# Patient Record
Sex: FEMALE | Race: ASIAN | NOT HISPANIC OR LATINO | URBAN - METROPOLITAN AREA
[De-identification: names, ages, dates, MRNs, and addresses within clinical notes are randomized per-mention and may not be internally consistent; named-entity substitution may affect disease eponyms.]

---

## 2023-08-15 ENCOUNTER — EMERGENCY (EMERGENCY)
Facility: HOSPITAL | Age: 58
LOS: 1 days | Discharge: HOME | End: 2023-08-15
Attending: EMERGENCY MEDICINE
Payer: MEDICARE

## 2023-08-15 VITALS
OXYGEN SATURATION: 100 % | RESPIRATION RATE: 20 BRPM | SYSTOLIC BLOOD PRESSURE: 151 MMHG | TEMPERATURE: 98 F | DIASTOLIC BLOOD PRESSURE: 91 MMHG | WEIGHT: 108.03 LBS | HEART RATE: 66 BPM

## 2023-08-15 VITALS
SYSTOLIC BLOOD PRESSURE: 133 MMHG | TEMPERATURE: 98 F | HEART RATE: 60 BPM | DIASTOLIC BLOOD PRESSURE: 84 MMHG | RESPIRATION RATE: 18 BRPM | OXYGEN SATURATION: 98 %

## 2023-08-15 PROCEDURE — 99283 EMERGENCY DEPT VISIT LOW MDM: CPT

## 2023-08-15 PROCEDURE — 99283 EMERGENCY DEPT VISIT LOW MDM: CPT | Mod: FS

## 2023-08-15 RX ORDER — DIAZEPAM 5 MG
1 TABLET ORAL
Qty: 5 | Refills: 0
Start: 2023-08-15 | End: 2023-08-19

## 2023-08-15 RX ORDER — ACETAMINOPHEN 500 MG
975 TABLET ORAL ONCE
Refills: 0 | Status: COMPLETED | OUTPATIENT
Start: 2023-08-15 | End: 2023-08-15

## 2023-08-15 RX ADMIN — Medication 975 MILLIGRAM(S): at 15:59

## 2023-08-15 NOTE — ED PROVIDER NOTE - OBJECTIVE STATEMENT
59yo F with PMH glaucoma, hypothyroidism presenting with L-sided jaw pain s/p jaw dislocation earlier today. Reports history of jaw dislocation > 20 years ago, had followed with a TMJ specialist at [facility], but has had no issues again until recently. Reports more frequently L-sided TMJ dislocations but that she is able to get jaw back in quickly. Today went to eat a mini muffin and L side of jaw dislocated when she opened her mouth, reports spasms of muscles and unable to get jaw back in for 2 hours, finally able to prior to ED. Report she is flying to [place] tomorrow and concerned about reoccurrence. Reports discomfort to L TMJ now, did not take anything for pain. No trouble breathing. 57yo F with PMH glaucoma, hypothyroidism, presenting with L-sided jaw pain s/p jaw dislocation earlier today. Reports history of jaw dislocation > 20 years ago, had followed with a TMJ specialist at [facility], but has had no issues again until recently. Reports more frequently L-sided TMJ dislocations recently but that she is able to get jaw back in quickly. Today went to eat a mini muffin and L side of jaw dislocated when she opened her mouth, reports spasms of cheek muscles and unable to get jaw back in for 2 hours, finally able to prior to coming to ED. Report she is flying to [place] tomorrow and concerned about reoccurrence. Reports discomfort to L TMJ now, did not take anything for pain. No trouble breathing.

## 2023-08-15 NOTE — ED ADULT NURSE NOTE - OBJECTIVE STATEMENT
58Y F AXO3 PMH of glaucoma and no pertinent PSH presented to the ED c/o L sided jaw pain and locking since today. Pt states she has TMJ and endorses that the L side of her jaw "moved out of the socket." Pt reports using a warm compress and massage to "put it back in place." Pt endorses being unable to speak or close mouth for 2 hrs. Upon arrival to the ED, the pt is well appearing, has bilateral even and unlabored chest rise, and ambulatory with steady gait. Upon assessment, pt has even and bilateral peripheral pulses, ROM, even and bilateral strength and sensation of extremities, and ability to close and open mouth. Pt denies fevers , chills, n/v/d, chest pain, SOB, headaches, vision changes, numbness or tingling of extremities. Comfort and safety provided.

## 2023-08-15 NOTE — ED PROVIDER NOTE - CLINICAL SUMMARY MEDICAL DECISION MAKING FREE TEXT BOX
58F hx of TMJ with prior jaw dislocations here with c/o L sided jaw pain after L side jaw dislocation earlier today. Dislocation was non-traumatic, occurred w chewing. Relocation was spontaneous after massaging masseter. Now w masseter pain and spasm on L. No malocclusion. Able to open/close jaw w/o pain or clicking. Will give muscle relaxer Rx and rec NSAIDS, soft diet, FU dentist/TMJ specialist.

## 2023-08-15 NOTE — ED PROVIDER NOTE - NSFOLLOWUPCLINICS_GEN_ALL_ED_FT
[facility] ENT  ENT  [address], [address]  [place] [address]  Phone: [phone]  Fax:   Follow Up Time: 1-3 Days

## 2023-08-15 NOTE — ED PROVIDER NOTE - NSFOLLOWUPINSTRUCTIONS_ED_ALL_ED_FT
Take Ibuprofen (i.e. Motrin, Advil) 600mg every 8 hrs for pain as needed. Take with food.   May alternate with Acetaminophen (Tylenol) 650mg every 6 hours for pain as needed.     Take Valium 5mg once as needed for muscle spasm in setting of jaw dislocation. ***caution drowsiness do not drive or drink alcohol if taking this medication. Do not take more than 1 tab every 8 hours (Max 3 doses per day).    Please follow up with ENT or your TMJ specialist upon discharge.     Return to ER for any new/worsening jaw pain, trouble breathing or swallowing, facial swelling, tongue swelling, or any other concerns. Eat a soft diet. Avoid opening your mouth wide.     Take Ibuprofen (i.e. Motrin, Advil) 600mg every 8 hrs for pain as needed. Take with food.   May alternate with Acetaminophen (Tylenol) 650mg every 6 hours for pain as needed.     Take Valium 5mg once as needed for muscle spasm in setting of jaw dislocation. ***caution drowsiness do not drive or drink alcohol if taking this medication. Do not take more than 1 tab every 8 hours (Max 3 doses per day).    Please follow up with ENT or your TMJ specialist upon discharge.     Return to ER for any new/worsening jaw pain, trouble breathing or swallowing, facial swelling, tongue swelling, or any other concerns.

## 2023-08-15 NOTE — ED PROVIDER NOTE - PATIENT PORTAL LINK FT
You can access the FollowMyHealth Patient Portal offered by [facility] by registering at the following website: [email or web address]. By joining [employer]’s FollowMyHealth portal, you will also be able to view your health information using other applications (apps) compatible with our system.

## 2023-08-15 NOTE — ED PROVIDER NOTE - ATTENDING SHARED VISIT SELECTORS
Chief Complaint   Patient presents with    Follow-Up from Hospital    Hypertension         1. \"Have you been to the ER, urgent care clinic since your last visit? Hospitalized since your last visit? \" [facility] 7/14    2. \"Have you seen or consulted any other health care providers outside of the [address] [place] [address] since your last visit? \" No    3. For patients aged 43-73: Has the patient had a colonoscopy / FIT/ Cologuard? Completed       If the patient is female:    4. For patients aged 43-66: Has the patient had a mammogram within the past 2 years? Yes    5. For patients aged 21-65: Has the patient had a pap smear?  Yes\ g topically 2 times daily 100 g 1    escitalopram (LEXAPRO) 10 MG tablet Take 1 tablet by mouth daily 90 tablet 1    nystatin (MYCOSTATIN) 629522 UNIT/GM cream APPLY CREAM TO AFFECTED AREA TWICE DAILY AS NEEDED FOR SKIN IRRITATION 30 g 0    budesonide-formoterol (SYMBICORT) 160-4.5 MCG/ACT AERO INHALE 2 PUFFS BY MOUTH ONCE DAILY      famotidine (PEPCID) 40 MG tablet TAKE 1 TABLET BY MOUTH ONCE DAILY BEFORE BREAKFAST      lisinopril-hydroCHLOROthiazide (PRINZIDE;ZESTORETIC) 10-12.5 MG per tablet Take 1 tablet by mouth once daily 90 tablet 0    metFORMIN (GLUCOPHAGE) 500 MG tablet Take 1 tablet by mouth twice daily 180 tablet 0    simvastatin (ZOCOR) 10 MG tablet Take 1 tablet by mouth nightly 90 tablet 0    Menaquinone-7 (VITAMIN K2 PO) Take by mouth      acetaminophen (TYLENOL) 500 MG tablet Take 1 tablet by mouth 2 times daily as needed      albuterol sulfate HFA (PROVENTIL;VENTOLIN;PROAIR) 108 (90 Base) MCG/ACT inhaler Inhale 2 puffs into the lungs every 6 hours as needed      calcium carb-cholecalciferol 250-3. 125 MG-MCG TABS per tab Take 1 tablet by mouth daily      vitamin D3 (CHOLECALCIFEROL) 125 MCG (5000 UT) TABS tablet Take 1 tablet by mouth daily      diclofenac sodium (VOLTAREN) 1 % GEL Apply topically 4 times daily      fluticasone (FLONASE) 50 MCG/ACT nasal spray 2 sprays by Nasal route daily      ondansetron (ZOFRAN) 4 MG tablet Take 1 tablet by mouth every 8 hours as needed      sodium chloride (OCEAN) 0.65 % nasal spray 2 sprays by Nasal route       No current facility-administered medications on file prior to visit. Review of Systems   Constitutional:  Positive for fatigue. HENT:  Positive for nosebleeds. Respiratory: Negative. Cardiovascular: Negative. Musculoskeletal: Negative. Neurological:  Positive for dizziness and headaches. Objective    Physical Exam  Vitals and nursing note reviewed. Constitutional:       General: She is not in acute distress. History/Exam/Medical Decision Making